# Patient Record
Sex: FEMALE | Race: WHITE | Employment: FULL TIME | URBAN - METROPOLITAN AREA
[De-identification: names, ages, dates, MRNs, and addresses within clinical notes are randomized per-mention and may not be internally consistent; named-entity substitution may affect disease eponyms.]

---

## 2017-04-05 ENCOUNTER — OFFICE VISIT (OUTPATIENT)
Dept: OBGYN CLINIC | Age: 42
End: 2017-04-05
Payer: COMMERCIAL

## 2017-04-05 ENCOUNTER — HOSPITAL ENCOUNTER (OUTPATIENT)
Age: 42
Setting detail: SPECIMEN
Discharge: HOME | End: 2017-04-05
Payer: COMMERCIAL

## 2017-04-05 VITALS
SYSTOLIC BLOOD PRESSURE: 110 MMHG | HEIGHT: 63 IN | BODY MASS INDEX: 25.27 KG/M2 | WEIGHT: 142.6 LBS | DIASTOLIC BLOOD PRESSURE: 60 MMHG

## 2017-04-05 DIAGNOSIS — N91.2 AMENORRHEA: ICD-10-CM

## 2017-04-05 DIAGNOSIS — Z01.419 VISIT FOR GYNECOLOGIC EXAMINATION: Primary | ICD-10-CM

## 2017-04-05 DIAGNOSIS — Z12.31 VISIT FOR SCREENING MAMMOGRAM: ICD-10-CM

## 2017-04-05 PROCEDURE — 99213 OFFICE O/P EST LOW 20 MIN: CPT | Performed by: OBSTETRICS & GYNECOLOGY

## 2017-04-05 ASSESSMENT — ENCOUNTER SYMPTOMS
BACK PAIN: 0
ABDOMINAL PAIN: 0
SHORTNESS OF BREATH: 0
DIARRHEA: 0
ABDOMINAL DISTENTION: 0
COUGH: 0
CONSTIPATION: 0

## 2017-04-07 ENCOUNTER — PROCEDURE VISIT (OUTPATIENT)
Dept: OBGYN CLINIC | Age: 42
End: 2017-04-07
Payer: COMMERCIAL

## 2017-04-07 DIAGNOSIS — N91.2 AMENORRHEA: ICD-10-CM

## 2017-04-07 PROCEDURE — 76856 US EXAM PELVIC COMPLETE: CPT | Performed by: OBSTETRICS & GYNECOLOGY

## 2017-04-07 PROCEDURE — 76830 TRANSVAGINAL US NON-OB: CPT | Performed by: OBSTETRICS & GYNECOLOGY

## 2017-04-10 LAB
HPV SAMPLE: NORMAL
HPV SOURCE: NORMAL
HPV, GENOTYPE 16: NOT DETECTED
HPV, GENOTYPE 18: NOT DETECTED
HPV, HIGH RISK OTHER: NOT DETECTED
HPV, INTERPRETATION: NORMAL

## 2017-04-11 ENCOUNTER — TELEPHONE (OUTPATIENT)
Dept: OBGYN CLINIC | Age: 42
End: 2017-04-11

## 2017-04-11 LAB — CYTOLOGY REPORT: NORMAL

## 2017-04-17 ENCOUNTER — TELEPHONE (OUTPATIENT)
Dept: OBGYN CLINIC | Age: 42
End: 2017-04-17

## 2017-04-17 DIAGNOSIS — N91.2 AMENORRHEA: ICD-10-CM

## 2017-04-17 LAB
BASOPHILS ABSOLUTE: NORMAL /ΜL
BASOPHILS RELATIVE PERCENT: NORMAL %
EOSINOPHILS ABSOLUTE: NORMAL /ΜL
EOSINOPHILS RELATIVE PERCENT: NORMAL %
HCT VFR BLD CALC: NORMAL % (ref 36–46)
HEMOGLOBIN: NORMAL G/DL (ref 12–16)
LYMPHOCYTES ABSOLUTE: NORMAL /ΜL
LYMPHOCYTES RELATIVE PERCENT: NORMAL %
MCH RBC QN AUTO: NORMAL PG
MCHC RBC AUTO-ENTMCNC: NORMAL G/DL
MCV RBC AUTO: NORMAL FL
MONOCYTES ABSOLUTE: NORMAL /ΜL
MONOCYTES RELATIVE PERCENT: NORMAL %
NEUTROPHILS ABSOLUTE: NORMAL /ΜL
NEUTROPHILS RELATIVE PERCENT: NORMAL %
PDW BLD-RTO: NORMAL %
PLATELET # BLD: NORMAL K/ΜL
PMV BLD AUTO: NORMAL FL
PROLACTIN: NORMAL
RBC # BLD: NORMAL 10^6/ΜL
TSH SERPL DL<=0.05 MIU/L-ACNC: NORMAL UIU/ML
WBC # BLD: NORMAL 10^3/ML

## 2017-04-25 ENCOUNTER — HOSPITAL ENCOUNTER (OUTPATIENT)
Dept: MAMMOGRAPHY | Age: 42
Discharge: HOME | End: 2017-04-25
Payer: COMMERCIAL

## 2017-04-25 DIAGNOSIS — Z12.31 VISIT FOR SCREENING MAMMOGRAM: ICD-10-CM

## 2017-04-25 PROCEDURE — G0202 SCR MAMMO BI INCL CAD: HCPCS

## 2018-06-13 ENCOUNTER — OFFICE VISIT (OUTPATIENT)
Dept: OBGYN CLINIC | Age: 43
End: 2018-06-13
Payer: COMMERCIAL

## 2018-06-13 ENCOUNTER — HOSPITAL ENCOUNTER (OUTPATIENT)
Age: 43
Setting detail: SPECIMEN
Discharge: HOME | End: 2018-06-13

## 2018-06-13 VITALS
SYSTOLIC BLOOD PRESSURE: 114 MMHG | WEIGHT: 145.2 LBS | HEIGHT: 63 IN | BODY MASS INDEX: 25.73 KG/M2 | DIASTOLIC BLOOD PRESSURE: 70 MMHG

## 2018-06-13 DIAGNOSIS — Z12.39 SCREENING FOR BREAST CANCER: ICD-10-CM

## 2018-06-13 DIAGNOSIS — Z01.419 VISIT FOR GYNECOLOGIC EXAMINATION: Primary | ICD-10-CM

## 2018-06-13 PROCEDURE — 99396 PREV VISIT EST AGE 40-64: CPT | Performed by: OBSTETRICS & GYNECOLOGY

## 2018-06-13 ASSESSMENT — ENCOUNTER SYMPTOMS
SHORTNESS OF BREATH: 0
ABDOMINAL PAIN: 0
DIARRHEA: 0
ABDOMINAL DISTENTION: 0
CONSTIPATION: 0
COUGH: 0
BACK PAIN: 0

## 2018-07-05 LAB
CYTOLOGY REPORT: NORMAL
HPV SAMPLE: NORMAL
HPV SOURCE: NORMAL
HPV, GENOTYPE 16: NOT DETECTED
HPV, GENOTYPE 18: NOT DETECTED
HPV, HIGH RISK OTHER: NOT DETECTED
HPV, INTERPRETATION: NORMAL

## 2019-07-29 ENCOUNTER — OFFICE VISIT (OUTPATIENT)
Dept: OBGYN CLINIC | Age: 44
End: 2019-07-29
Payer: COMMERCIAL

## 2019-07-29 VITALS
DIASTOLIC BLOOD PRESSURE: 60 MMHG | HEIGHT: 63 IN | BODY MASS INDEX: 26.05 KG/M2 | WEIGHT: 147 LBS | SYSTOLIC BLOOD PRESSURE: 100 MMHG

## 2019-07-29 DIAGNOSIS — Z12.39 BREAST CANCER SCREENING: ICD-10-CM

## 2019-07-29 DIAGNOSIS — Z01.419 VISIT FOR GYNECOLOGIC EXAMINATION: Primary | ICD-10-CM

## 2019-07-29 PROCEDURE — 99396 PREV VISIT EST AGE 40-64: CPT | Performed by: OBSTETRICS & GYNECOLOGY

## 2019-07-29 ASSESSMENT — ENCOUNTER SYMPTOMS
CONSTIPATION: 0
SHORTNESS OF BREATH: 0
BACK PAIN: 0
ABDOMINAL DISTENTION: 0
DIARRHEA: 0
COUGH: 0
ABDOMINAL PAIN: 0

## 2019-07-29 NOTE — PROGRESS NOTES
Subjective:      Patient ID: Wilmer Deluca is a 39 y.o. female. HPI   Wilmer Deluca is a 39 y.o. [id number], here for her annual exam.  The patient was seen and examined. The patients past medical, surgical, social and family history were reviewed. Current medications and allergies were reviewed, and documented in the chart. The patient has no new complaints. However, her menses are getting a bit heavier again. She has been enjoying her summer with kids. 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   Wt Readings from Last 3 Encounters:   07/29/19 147 lb (66.7 kg)   03/05/19 151 lb (68.5 kg)   06/13/18 145 lb 3.2 oz (65.9 kg)     Recent Results (from the past 8736 hour(s))   Comprehensive Metabolic Panel, Fasting    Collection Time: 06/17/19 12:00 AM   Result Value Ref Range    Glucose, Fasting 91 mg/dL    CREATININE 0.65     BUN 13 mg/dL    Sodium 136 mmol/L    Potassium 4.1 mmol/L    Chloride 104 mmol/L    CO2 28 mmol/L    Calcium 9.4 mg/dL    AST 17 U/L    Alkaline Phosphatase 40 U/L    Total Protein 6.8 6.4 - 8.2 g/dL    Alb 4.4     Total Bilirubin 0.3 0.1 - 1.4 mg/dL    ALT 14 U/L   Lipid, Fasting    Collection Time: 06/17/19 12:00 AM   Result Value Ref Range    Cholesterol, Fasting 189     Triglyceride, Fasting 71     HDL 56 35 - 70 mg/dL    LDL Calculated 119 0 - 160 mg/dL   CBC Auto Differential    Collection Time: 06/17/19 12:00 AM   Result Value Ref Range    WBC 5.33 10^3/mL    RBC 4.34 10^6/µL    Hemoglobin 12.9 12.0 - 16.0 g/dL    Hematocrit 41.5 36 - 46 %    MCV 95.6 fL    MCH 29.7 pg    MCHC 31.1 (L) g/dL    Platelets 463 K/µL    RDW 14.2 %    MPV  fL    Neutrophils % 49.7 %    Lymphocytes % 31.1 %    Monocytes %

## 2020-07-14 ENCOUNTER — TELEPHONE (OUTPATIENT)
Dept: OBGYN CLINIC | Age: 45
End: 2020-07-14

## 2020-08-12 ENCOUNTER — OFFICE VISIT (OUTPATIENT)
Dept: OBGYN CLINIC | Age: 45
End: 2020-08-12
Payer: COMMERCIAL

## 2020-08-12 VITALS
BODY MASS INDEX: 26.75 KG/M2 | WEIGHT: 151 LBS | DIASTOLIC BLOOD PRESSURE: 60 MMHG | SYSTOLIC BLOOD PRESSURE: 110 MMHG | HEIGHT: 63 IN

## 2020-08-12 LAB — PAP SMEAR: NORMAL

## 2020-08-12 PROCEDURE — 99396 PREV VISIT EST AGE 40-64: CPT | Performed by: NURSE PRACTITIONER

## 2020-08-12 ASSESSMENT — ENCOUNTER SYMPTOMS
CONSTIPATION: 0
ABDOMINAL DISTENTION: 0
DIARRHEA: 0
ABDOMINAL PAIN: 0
SHORTNESS OF BREATH: 0
BACK PAIN: 0
COUGH: 0

## 2020-08-12 NOTE — PROGRESS NOTES
HPI:     Lora Oconnor a 38 y.o. female who presents today for:  Chief Complaint   Patient presents with    Annual Exam     Prev Pap: 6/13/18 ASCUS/HPV Neg; Prev Lan: 2017 WNL       HPI  Here for annual exam. 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 Has 4 children- 19/17/16/11. Has been trying to eat healthy and exercises often. GYNECOLOGICHISTORY:  MenstrualHistory: Patient's last menstrual period was 07/03/2020. Sexually Transmitted Infections: No  History of Ectopic Pregnancy:No  Menarche: 10  Cycles q 28 Days  Durationof cycles: 5  Dysmenorrhea: minimal  Sexually active: yes  Dyspareunia: none    Current Outpatient Medications   Medication Sig Dispense Refill    ALPRAZolam (XANAX) 0.25 MG tablet TAKE ONE TABLET BY MOUTH THREE TIMES A DAY AS NEEDED FOR ANXIETY FOR UP TO 30 DAYS 10 tablet 0    sertraline (ZOLOFT) 50 MG tablet Take 1 tablet by mouth daily 30 tablet 1    SUMAtriptan (IMITREX) 100 MG tablet Take one at onset of migraine. 12 tablet 1    Probiotic Product (PROBIOTIC DAILY PO) Take by mouth      NONFORMULARY Vitamin D,      Multiple Vitamin (MULTI VITAMIN DAILY PO) Take by mouth       No current facility-administered medications for this visit. No Known Allergies    Past Medical History:   Diagnosis Date    Migraine 9/18/2014     Denies family history of breast, ovarian, uterus, colon CA     History reviewed. No pertinent surgical history. Family History   Problem Relation Age of Onset    Diabetes Father     Hypertension Father     Heart Disease Father     Cancer Father         prostate and lung    Diabetes Mother     Hypertension Mother     Breast Cancer Mother     Arthritis Mother     Depression Mother     High Cholesterol Mother     Vision Loss Mother         macular degeneration    Hearing Loss Mother     Breast Cancer Paternal Grandmother         ?      Social History     Tobacco Use    Smoking status: Never Smoker    Smokeless tobacco: Never Used Substance Use Topics    Alcohol use: Yes     Comment: social        Subjective:      Review of Systems   Constitutional: Negative for appetite change and fatigue. HENT: Negative for congestion and hearing loss. Eyes: Negative for visual disturbance. Respiratory: Negative for cough and shortness of breath. Cardiovascular: Negative for chest pain and palpitations. Gastrointestinal: Negative for abdominal distention, abdominal pain, constipation and diarrhea. Genitourinary: Negative for flank pain, frequency, menstrual problem, pelvic pain and vaginal discharge. Musculoskeletal: Negative for back pain. Neurological: Negative for syncope and headaches. Psychiatric/Behavioral: Negative for behavioral problems. Objective:     /60 (Position: Sitting, Cuff Size: Medium Adult)   Ht 5' 3\" (1.6 m)   Wt 151 lb (68.5 kg)   LMP 07/03/2020   BMI 26.75 kg/m²   Physical Exam  Constitutional:       Appearance: She is well-developed. Eyes:      Pupils: Pupils are equal, round, and reactive to light. Neck:      Thyroid: No thyromegaly. Trachea: No tracheal deviation. Cardiovascular:      Rate and Rhythm: Normal rate and regular rhythm. Heart sounds: Normal heart sounds. Pulmonary:      Effort: Pulmonary effort is normal. No respiratory distress. Breath sounds: Normal breath sounds. Chest:      Breasts: Breasts are symmetrical.         Right: No inverted nipple. Left: No inverted nipple, mass, nipple discharge, skin change or tenderness. Abdominal:      General: Bowel sounds are normal. There is no distension. Palpations: Abdomen is soft. There is no mass. Tenderness: There is no abdominal tenderness. Hernia: There is no hernia in the left inguinal area. Genitourinary:     Labia:         Right: No rash or lesion. Left: No rash or lesion. Vagina: No vaginal discharge or tenderness.       Cervix: No cervical motion tenderness, discharge or friability. Uterus: Not deviated, not enlarged and not fixed. Adnexa:         Right: No mass, tenderness or fullness. Left: No mass, tenderness or fullness. Musculoskeletal:         General: No tenderness. Lymphadenopathy:      Cervical: No cervical adenopathy. Skin:     General: Skin is warm and dry. Neurological:      Mental Status: She is alert and oriented to person, place, and time. Psychiatric:         Behavior: Behavior normal.         Thought Content: Thought content normal.         Judgment: Judgment normal.           Assessment:     1. Encounter for gynecological examination    2. Encounter for screening mammogram for breast cancer          Plan:    1. Pap collected. Discussed new pap smear guidelines. Desires re-pap in 1 year. 2. Screening mammogram discussed and advised yearly if within normal limits. 3. Calcium and Vitamin D dosing reviewed. 4. Colonoscopy screening reviewed. 5. Bone density testing reviewed.      Electronicallysigned by Juan Carlos Nielsen on 8/12/2020

## 2020-10-12 ENCOUNTER — TELEPHONE (OUTPATIENT)
Dept: OBGYN CLINIC | Age: 45
End: 2020-10-12

## 2020-10-12 NOTE — TELEPHONE ENCOUNTER
39 y/o Notified pt that Mammogram recommended MRI for breast surveillance due 6 mo(4/21). Pt agreeable to plan of care and orders will be sent to [facility] [place] next spring 4/21.

## 2022-02-16 DIAGNOSIS — Z12.31 VISIT FOR SCREENING MAMMOGRAM: Primary | ICD-10-CM

## 2022-02-22 ENCOUNTER — TELEPHONE (OUTPATIENT)
Dept: OBGYN CLINIC | Age: 47
End: 2022-02-22

## 2022-02-22 NOTE — TELEPHONE ENCOUNTER
She may be starting another cycle? Have her use ibuprofen 800 mg every 8 hours with food and call on Friday with an update. Please review bleeding precautions. If she bleeds for more than 7 days, please have her call and I will order an US. She is overdue for her annual, so I would recommend scheduling.

## 2022-02-22 NOTE — TELEPHONE ENCOUNTER
Pt called she has been on her period for 3 weeks the first 2 was bright red regular flow then 1 week if brown and now turning red again not on any form of BC she is wondering what you recommend please advise

## 2022-03-04 ENCOUNTER — TELEPHONE (OUTPATIENT)
Dept: OBGYN CLINIC | Age: 47
End: 2022-03-04

## 2022-03-04 DIAGNOSIS — N92.1 MENORRHAGIA WITH IRREGULAR CYCLE: Primary | ICD-10-CM

## 2022-03-04 RX ORDER — TRANEXAMIC ACID 650 1/1
1300 TABLET ORAL 3 TIMES DAILY
Qty: 30 TABLET | Refills: 0 | Status: SHIPPED | OUTPATIENT
Start: 2022-03-04 | End: 2022-03-16 | Stop reason: ALTCHOICE

## 2022-03-04 NOTE — TELEPHONE ENCOUNTER
Aletha Master calling with update on bleeding. It is still on going, 3 weeks now. Not as heavy but moderate flow. Has been doing Motrin as directed. Took today off hoping to be able to get in. No openings with anybody. She did mention that Kaye Harper was possibly going to order an USN if it persists. Shlomo Whitten has openings so I put her in this afternoon if you want to order it. She will plan for that unless Kaye Harper says otherwise.      Did schedule her annual for 4/4/22

## 2022-03-04 NOTE — TELEPHONE ENCOUNTER
Agree w/US. Do I need to put order in? Once I see US, I can figure out how to help stop bleeding. [address] Ak[address]il Jordan- Could I have the printed report when you are done with her?

## 2022-03-06 ASSESSMENT — ENCOUNTER SYMPTOMS
COUGH: 0
SHORTNESS OF BREATH: 0
BACK PAIN: 0
ABDOMINAL PAIN: 0

## 2022-03-06 NOTE — PROGRESS NOTES
mouth (Patient not taking: Reported on 11/29/2021)       No current facility-administered medications for this visit. Social History     Socioeconomic History    Marital status:      Spouse name: Not on file    Number of children: Not on file    Years of education: Not on file    Highest education level: Not on file   Occupational History    Not on file   Tobacco Use    Smoking status: Never Smoker    Smokeless tobacco: Never Used   Vaping Use    Vaping Use: Never used   Substance and Sexual Activity    Alcohol use: Yes     Comment: social    Drug use: No    Sexual activity: Yes     Partners: Male   Other Topics Concern    Not on file   Social History Narrative    Not on file     Social Determinants of Health     Financial Resource Strain:     Difficulty of Paying Living Expenses: Not on file   Food Insecurity:     Worried About Running Out of Food in the Last Year: Not on file    Sai of Food in the Last Year: Not on file   Transportation Needs:     Lack of Transportation (Medical): Not on file    Lack of Transportation (Non-Medical):  Not on file   Physical Activity:     Days of Exercise per Week: Not on file    Minutes of Exercise per Session: Not on file   Stress:     Feeling of Stress : Not on file   Social Connections:     Frequency of Communication with Friends and Family: Not on file    Frequency of Social Gatherings with Friends and Family: Not on file    Attends Yazidism Services: Not on file    Active Member of Clubs or Organizations: Not on file    Attends Club or Organization Meetings: Not on file    Marital Status: Not on file   Intimate Partner Violence:     Fear of Current or Ex-Partner: Not on file    Emotionally Abused: Not on file    Physically Abused: Not on file    Sexually Abused: Not on file   Housing Stability:     Unable to Pay for Housing in the Last Year: Not on file    Number of [place] in the Last Year: Not on file    Unstable Housing in the Last Year: Not on file     Family History   Problem Relation Age of Onset    Diabetes Father     Hypertension Father     Heart Disease Father     Cancer Father         prostate and lung    Diabetes Mother     Hypertension Mother     Breast Cancer Mother     Arthritis Mother     Depression Mother     High Cholesterol Mother     Vision Loss Mother         macular degeneration    Hearing Loss Mother     Breast Cancer Paternal Grandmother         ? Physical exam Physical Exam  Constitutional:       Appearance: Normal appearance. She is normal weight. HENT:      Head: Normocephalic. Eyes:      Extraocular Movements: Extraocular movements intact. Pulmonary:      Effort: Pulmonary effort is normal.   Neurological:      Mental Status: She is alert and oriented to person, place, and time. Psychiatric:         Mood and Affect: Mood normal.         Behavior: Behavior normal.         Thought Content: Thought content normal.         Judgment: Judgment normal.         UTERUS: 11.8 x 6.8 x 7.1 cm  retroflexed     ENDO: 4.0 cm  Thickened and complex.     RT. OVARY: 5.2 x 4.1 x 3.3 cm  Simple cyst seen = 4.1 x 2.8 x 2.9 cm     LT. OVARY: 2.6 x 2.6 x 1.6 cm     Small amount pelvic free fluid seen    Assessment/Plan  1. Abnormal uterine bleeding (AUB)  2. Menorrhagia with irregular cycle  - Thickened lining of the uterus measuring 4 cm  - Discussed recommendations of hysteroscopy D&C to sample lining of uterus.    - Discussed risks of surgery in normal detailed fashion. All questions answered.      Will schedule pt for a hysteroscopy D&C   Rosario Fields MD  [address]

## 2022-03-07 ENCOUNTER — OFFICE VISIT (OUTPATIENT)
Dept: OBGYN CLINIC | Age: 47
End: 2022-03-07
Payer: COMMERCIAL

## 2022-03-07 VITALS
HEART RATE: 84 BPM | WEIGHT: 154.13 LBS | HEIGHT: 63 IN | DIASTOLIC BLOOD PRESSURE: 65 MMHG | SYSTOLIC BLOOD PRESSURE: 125 MMHG | BODY MASS INDEX: 27.31 KG/M2

## 2022-03-07 DIAGNOSIS — N92.1 MENORRHAGIA WITH IRREGULAR CYCLE: ICD-10-CM

## 2022-03-07 DIAGNOSIS — N93.9 ABNORMAL UTERINE BLEEDING (AUB): Primary | ICD-10-CM

## 2022-03-07 PROCEDURE — 99213 OFFICE O/P EST LOW 20 MIN: CPT | Performed by: OBSTETRICS & GYNECOLOGY

## 2022-03-07 RX ORDER — TRANEXAMIC ACID 650 1/1
TABLET ORAL
Qty: 30 TABLET | Refills: 0 | OUTPATIENT
Start: 2022-03-07

## 2022-03-17 ENCOUNTER — ANESTHESIA (OUTPATIENT)
Dept: OPERATING ROOM | Age: 47
End: 2022-03-17
Payer: COMMERCIAL

## 2022-03-17 ENCOUNTER — HOSPITAL ENCOUNTER (OUTPATIENT)
Age: 47
Setting detail: OUTPATIENT SURGERY
Discharge: HOME | End: 2022-03-17
Attending: OBSTETRICS & GYNECOLOGY | Admitting: OBSTETRICS & GYNECOLOGY
Payer: COMMERCIAL

## 2022-03-17 ENCOUNTER — ANESTHESIA EVENT (OUTPATIENT)
Dept: OPERATING ROOM | Age: 47
End: 2022-03-17
Payer: COMMERCIAL

## 2022-03-17 VITALS
WEIGHT: 150 LBS | SYSTOLIC BLOOD PRESSURE: 108 MMHG | OXYGEN SATURATION: 98 % | HEART RATE: 65 BPM | DIASTOLIC BLOOD PRESSURE: 64 MMHG | HEIGHT: 63 IN | BODY MASS INDEX: 26.58 KG/M2 | TEMPERATURE: 97.3 F | RESPIRATION RATE: 17 BRPM

## 2022-03-17 VITALS — TEMPERATURE: 96.9 F | SYSTOLIC BLOOD PRESSURE: 84 MMHG | OXYGEN SATURATION: 100 % | DIASTOLIC BLOOD PRESSURE: 32 MMHG

## 2022-03-17 PROBLEM — Z98.890 S/P DILATATION AND CURETTAGE: Status: ACTIVE | Noted: 2022-03-17

## 2022-03-17 LAB
ABO/RH: NORMAL
ANTIBODY SCREEN: NEGATIVE
ARM BAND NUMBER: NORMAL
EXPIRATION DATE: NORMAL
HCG, PREGNANCY URINE (POC): NEGATIVE
HCT VFR BLD CALC: 37.1 % (ref 36.3–47.1)
HEMOGLOBIN: 12.1 G/DL (ref 11.9–15.1)
MCH RBC QN AUTO: 30.9 PG (ref 25.2–33.5)
MCHC RBC AUTO-ENTMCNC: 32.6 G/DL (ref 28.4–34.8)
MCV RBC AUTO: 94.6 FL (ref 82.6–102.9)
NRBC AUTOMATED: 0 PER 100 WBC
PDW BLD-RTO: 13.4 % (ref 11.8–14.4)
PLATELET # BLD: 318 K/UL (ref 138–453)
PMV BLD AUTO: 9.2 FL (ref 8.1–13.5)
RBC # BLD: 3.92 M/UL (ref 3.95–5.11)
WBC # BLD: 4.7 K/UL (ref 3.5–11.3)

## 2022-03-17 PROCEDURE — 2580000003 HC RX 258: Performed by: OBSTETRICS & GYNECOLOGY

## 2022-03-17 PROCEDURE — 88305 TISSUE EXAM BY PATHOLOGIST: CPT

## 2022-03-17 PROCEDURE — 2500000003 HC RX 250 WO HCPCS: Performed by: ANESTHESIOLOGY

## 2022-03-17 PROCEDURE — 7100000001 HC PACU RECOVERY - ADDTL 15 MIN: Performed by: OBSTETRICS & GYNECOLOGY

## 2022-03-17 PROCEDURE — 6370000000 HC RX 637 (ALT 250 FOR IP): Performed by: OBSTETRICS & GYNECOLOGY

## 2022-03-17 PROCEDURE — 86900 BLOOD TYPING SEROLOGIC ABO: CPT

## 2022-03-17 PROCEDURE — 7100000000 HC PACU RECOVERY - FIRST 15 MIN: Performed by: OBSTETRICS & GYNECOLOGY

## 2022-03-17 PROCEDURE — 6360000002 HC RX W HCPCS: Performed by: ANESTHESIOLOGY

## 2022-03-17 PROCEDURE — 58558 HYSTEROSCOPY BIOPSY: CPT | Performed by: OBSTETRICS & GYNECOLOGY

## 2022-03-17 PROCEDURE — 3700000001 HC ADD 15 MINUTES (ANESTHESIA): Performed by: OBSTETRICS & GYNECOLOGY

## 2022-03-17 PROCEDURE — 86901 BLOOD TYPING SEROLOGIC RH(D): CPT

## 2022-03-17 PROCEDURE — 3700000000 HC ANESTHESIA ATTENDED CARE: Performed by: OBSTETRICS & GYNECOLOGY

## 2022-03-17 PROCEDURE — 81025 URINE PREGNANCY TEST: CPT

## 2022-03-17 PROCEDURE — 3600000004 HC SURGERY LEVEL 4 BASE: Performed by: OBSTETRICS & GYNECOLOGY

## 2022-03-17 PROCEDURE — 86850 RBC ANTIBODY SCREEN: CPT

## 2022-03-17 PROCEDURE — 7100000040 HC SPAR PHASE II RECOVERY - FIRST 15 MIN: Performed by: OBSTETRICS & GYNECOLOGY

## 2022-03-17 PROCEDURE — 85027 COMPLETE CBC AUTOMATED: CPT

## 2022-03-17 PROCEDURE — 3600000014 HC SURGERY LEVEL 4 ADDTL 15MIN: Performed by: OBSTETRICS & GYNECOLOGY

## 2022-03-17 PROCEDURE — 2580000003 HC RX 258: Performed by: ANESTHESIOLOGY

## 2022-03-17 PROCEDURE — 2709999900 HC NON-CHARGEABLE SUPPLY: Performed by: OBSTETRICS & GYNECOLOGY

## 2022-03-17 RX ORDER — LIDOCAINE HYDROCHLORIDE 10 MG/ML
INJECTION, SOLUTION EPIDURAL; INFILTRATION; INTRACAUDAL; PERINEURAL PRN
Status: DISCONTINUED | OUTPATIENT
Start: 2022-03-17 | End: 2022-03-17 | Stop reason: SDUPTHER

## 2022-03-17 RX ORDER — DOCUSATE SODIUM 100 MG/1
100 CAPSULE, LIQUID FILLED ORAL 2 TIMES DAILY PRN
Qty: 60 CAPSULE | Refills: 0 | Status: SHIPPED | OUTPATIENT
Start: 2022-03-17 | End: 2022-03-25 | Stop reason: ALTCHOICE

## 2022-03-17 RX ORDER — MIDAZOLAM HYDROCHLORIDE 1 MG/ML
INJECTION INTRAMUSCULAR; INTRAVENOUS PRN
Status: DISCONTINUED | OUTPATIENT
Start: 2022-03-17 | End: 2022-03-17 | Stop reason: SDUPTHER

## 2022-03-17 RX ORDER — SIMETHICONE 80 MG
80 TABLET,CHEWABLE ORAL 4 TIMES DAILY PRN
Qty: 60 TABLET | Refills: 1 | Status: SHIPPED | OUTPATIENT
Start: 2022-03-17 | End: 2022-03-25 | Stop reason: ALTCHOICE

## 2022-03-17 RX ORDER — ACETAMINOPHEN 500 MG
1000 TABLET ORAL EVERY 6 HOURS PRN
Qty: 80 TABLET | Refills: 1 | Status: SHIPPED | OUTPATIENT
Start: 2022-03-17 | End: 2022-03-25 | Stop reason: ALTCHOICE

## 2022-03-17 RX ORDER — DEXAMETHASONE SODIUM PHOSPHATE 10 MG/ML
INJECTION INTRAMUSCULAR; INTRAVENOUS PRN
Status: DISCONTINUED | OUTPATIENT
Start: 2022-03-17 | End: 2022-03-17 | Stop reason: SDUPTHER

## 2022-03-17 RX ORDER — FENTANYL CITRATE 50 UG/ML
INJECTION, SOLUTION INTRAMUSCULAR; INTRAVENOUS PRN
Status: DISCONTINUED | OUTPATIENT
Start: 2022-03-17 | End: 2022-03-17 | Stop reason: SDUPTHER

## 2022-03-17 RX ORDER — MAGNESIUM HYDROXIDE 1200 MG/15ML
LIQUID ORAL CONTINUOUS PRN
Status: COMPLETED | OUTPATIENT
Start: 2022-03-17 | End: 2022-03-17

## 2022-03-17 RX ORDER — ONDANSETRON 4 MG/1
4 TABLET, FILM COATED ORAL EVERY 8 HOURS PRN
Qty: 20 TABLET | Refills: 0 | Status: SHIPPED | OUTPATIENT
Start: 2022-03-17 | End: 2022-03-25 | Stop reason: ALTCHOICE

## 2022-03-17 RX ORDER — ONDANSETRON 2 MG/ML
INJECTION INTRAMUSCULAR; INTRAVENOUS PRN
Status: DISCONTINUED | OUTPATIENT
Start: 2022-03-17 | End: 2022-03-17 | Stop reason: SDUPTHER

## 2022-03-17 RX ORDER — PROPOFOL 10 MG/ML
INJECTION, EMULSION INTRAVENOUS PRN
Status: DISCONTINUED | OUTPATIENT
Start: 2022-03-17 | End: 2022-03-17 | Stop reason: SDUPTHER

## 2022-03-17 RX ORDER — KETOROLAC TROMETHAMINE 30 MG/ML
INJECTION, SOLUTION INTRAMUSCULAR; INTRAVENOUS PRN
Status: DISCONTINUED | OUTPATIENT
Start: 2022-03-17 | End: 2022-03-17 | Stop reason: SDUPTHER

## 2022-03-17 RX ORDER — SCOLOPAMINE TRANSDERMAL SYSTEM 1 MG/1
1 PATCH, EXTENDED RELEASE TRANSDERMAL
Status: DISCONTINUED | OUTPATIENT
Start: 2022-03-17 | End: 2022-03-17 | Stop reason: HOSPADM

## 2022-03-17 RX ORDER — TOBRAMYCIN AND DEXAMETHASONE 3; 1 MG/ML; MG/ML
SUSPENSION/ DROPS OPHTHALMIC
COMMUNITY
Start: 2022-02-21

## 2022-03-17 RX ORDER — MIDAZOLAM HYDROCHLORIDE 2 MG/2ML
0.5 INJECTION, SOLUTION INTRAMUSCULAR; INTRAVENOUS 4 TIMES DAILY PRN
Status: DISCONTINUED | OUTPATIENT
Start: 2022-03-17 | End: 2022-03-17 | Stop reason: HOSPADM

## 2022-03-17 RX ORDER — IBUPROFEN 600 MG/1
600 TABLET ORAL EVERY 6 HOURS PRN
Qty: 60 TABLET | Refills: 1 | Status: SHIPPED | OUTPATIENT
Start: 2022-03-17 | End: 2022-03-25 | Stop reason: ALTCHOICE

## 2022-03-17 RX ORDER — MAGNESIUM HYDROXIDE 1200 MG/15ML
LIQUID ORAL PRN
Status: DISCONTINUED | OUTPATIENT
Start: 2022-03-17 | End: 2022-03-17 | Stop reason: ALTCHOICE

## 2022-03-17 RX ORDER — SODIUM CHLORIDE, SODIUM LACTATE, POTASSIUM CHLORIDE, CALCIUM CHLORIDE 600; 310; 30; 20 MG/100ML; MG/100ML; MG/100ML; MG/100ML
INJECTION, SOLUTION INTRAVENOUS CONTINUOUS
Status: DISCONTINUED | OUTPATIENT
Start: 2022-03-17 | End: 2022-03-17 | Stop reason: HOSPADM

## 2022-03-17 RX ADMIN — PROPOFOL 200 MG: 10 INJECTION, EMULSION INTRAVENOUS at 11:11

## 2022-03-17 RX ADMIN — FENTANYL CITRATE 50 MCG: 50 INJECTION, SOLUTION INTRAMUSCULAR; INTRAVENOUS at 11:24

## 2022-03-17 RX ADMIN — SODIUM CHLORIDE, POTASSIUM CHLORIDE, SODIUM LACTATE AND CALCIUM CHLORIDE: 600; 310; 30; 20 INJECTION, SOLUTION INTRAVENOUS at 09:20

## 2022-03-17 RX ADMIN — MIDAZOLAM HYDROCHLORIDE 0.5 MG: 1 INJECTION, SOLUTION INTRAMUSCULAR; INTRAVENOUS at 10:19

## 2022-03-17 RX ADMIN — ONDANSETRON 4 MG: 2 INJECTION INTRAMUSCULAR; INTRAVENOUS at 11:41

## 2022-03-17 RX ADMIN — LIDOCAINE HYDROCHLORIDE 50 MG: 10 INJECTION, SOLUTION EPIDURAL; INFILTRATION; INTRACAUDAL at 11:11

## 2022-03-17 RX ADMIN — MIDAZOLAM HYDROCHLORIDE 2 MG: 1 INJECTION, SOLUTION INTRAMUSCULAR; INTRAVENOUS at 11:08

## 2022-03-17 RX ADMIN — MIDAZOLAM HYDROCHLORIDE 0.5 MG: 1 INJECTION, SOLUTION INTRAMUSCULAR; INTRAVENOUS at 10:25

## 2022-03-17 RX ADMIN — DEXAMETHASONE SODIUM PHOSPHATE 10 MG: 10 INJECTION INTRAMUSCULAR; INTRAVENOUS at 11:23

## 2022-03-17 RX ADMIN — FENTANYL CITRATE 50 MCG: 50 INJECTION, SOLUTION INTRAMUSCULAR; INTRAVENOUS at 11:11

## 2022-03-17 RX ADMIN — KETOROLAC TROMETHAMINE 30 MG: 30 INJECTION, SOLUTION INTRAMUSCULAR; INTRAVENOUS at 11:42

## 2022-03-17 ASSESSMENT — PULMONARY FUNCTION TESTS
PIF_VALUE: 15
PIF_VALUE: 4
PIF_VALUE: 15
PIF_VALUE: 2
PIF_VALUE: 18
PIF_VALUE: 15
PIF_VALUE: 2
PIF_VALUE: 15
PIF_VALUE: 4
PIF_VALUE: 15
PIF_VALUE: 15
PIF_VALUE: 2
PIF_VALUE: 5
PIF_VALUE: 15
PIF_VALUE: 2
PIF_VALUE: 15
PIF_VALUE: 1
PIF_VALUE: 18
PIF_VALUE: 15
PIF_VALUE: 1
PIF_VALUE: 15
PIF_VALUE: 1
PIF_VALUE: 1
PIF_VALUE: 15
PIF_VALUE: 1
PIF_VALUE: 15
PIF_VALUE: 15
PIF_VALUE: 2
PIF_VALUE: 15
PIF_VALUE: 1
PIF_VALUE: 16
PIF_VALUE: 1
PIF_VALUE: 15

## 2022-03-17 ASSESSMENT — PAIN - FUNCTIONAL ASSESSMENT: PAIN_FUNCTIONAL_ASSESSMENT: 0-10

## 2022-03-17 ASSESSMENT — PAIN SCALES - GENERAL
PAINLEVEL_OUTOF10: 0

## 2022-03-17 ASSESSMENT — LIFESTYLE VARIABLES: SMOKING_STATUS: 0

## 2022-03-17 NOTE — OP NOTE
measured 8 cm. The cervix was dilated with Jayden dilators to size 15. A diagnostic hysteroscope was inserted into the uterine cavity showing significant thickened tissue within the endocervical and endometrial cavity. The uterine contour also appeared to demonstrate a septate uterus. Endometrial curettage was carried out with sharp curettage yielding curettings which were collected and sent for pathology examination. All instruments were then removed. Hemostasis was visualized at the tenaculum site on the cervix. Instrument, sponge, and needle counts were correct at the conclusion of the case. SCDs for DVT prophylaxis remain in place for the post operative period. Dr. Gina Jara was present for the entire operation.     Findings:  Normal external genitalia, normal appearing vaginal mucosa without lesions, posterior cervical fibroid, retroverted uterus sounding to 8 cm, thick tissue within the endometrial and endocervical cavity, suspected septate uterus, patent tubal ostia  Estimated Blood Loss: 50 ml  Drains:  None  Total IV Fluids: 700 ml  Urine output: Patient voided prior to procedure   Specimens:  Endometrial curettings  Instrument and Sponge Count: Correct  Complications: none  Condition: stable, transfer to post anesthesia recovery    Micaela Santana DO  Ob/Gyn Resident  3/17/2022, 12:03 PM

## 2022-03-17 NOTE — ANESTHESIA PRE PROCEDURE
Department of Anesthesiology  Preprocedure Note       Name:  Supriya Matthews   Age:  51 y.o.  :  1975                                          MRN:  [id number]         Date:  3/17/2022      Surgeon: Steven Curtis):  Be Arreguin MD    Procedure: Procedure(s):  DILATATION AND CURETTAGE HYSTEROSCOPY    Medications prior to admission:   Prior to Admission medications    Medication Sig Start Date End Date Taking?  Authorizing Provider   ibuprofen (ADVIL;MOTRIN) 600 MG tablet Take 1 tablet by mouth every 6 hours as needed for Pain 3/17/22 4/16/22 Yes Selma Linear, DO   acetaminophen (TYLENOL) 500 MG tablet Take 2 tablets by mouth every 6 hours as needed for Pain 3/17/22 4/16/22 Yes Bette Castro, DO   docusate sodium (COLACE) 100 MG capsule Take 1 capsule by mouth 2 times daily as needed for Constipation 3/17/22  Yes Selma Linear, DO   simethicone (MYLICON) 80 MG chewable tablet Take 1 tablet by mouth 4 times daily as needed for Flatulence 3/17/22  Yes Bette Castro,    ondansetron (ZOFRAN) 4 MG tablet Take 1 tablet by mouth every 8 hours as needed for Nausea or Vomiting 3/17/22  Yes Bette Castro DO   SUMAtriptan (IMITREX) 100 MG tablet TAKE ONE TABLET BY MOUTH AT ONSET OF HEADACHE; MAY REPEAT ONE TABLET IN 2 HOURS IF NEEDED. 21  Yes Cornell Herrera,    Multiple Vitamin (MULTI VITAMIN DAILY PO) Take 1 tablet by mouth daily    Yes Historical Provider, MD   tobramycin-dexamethasone (TOBRADEX) 0.3-0.1 % ophthalmic suspension  22   Historical Provider, MD       Current medications:    Current Facility-Administered Medications   Medication Dose Route Frequency Provider Last Rate Last Admin    scopolamine (TRANSDERM-SCOP) transdermal patch 1 patch  1 patch TransDERmal On Call to Ailin Ho MD   1 patch at 22 0926    lactated ringers infusion   IntraVENous Continuous Chyrl MD Maurizio 100 mL/hr at 22 0920 New Bag at 22 0920       Allergies:  No Known Allergies    Problem List:    Patient Active Problem List   Diagnosis Code    Menorrhagia N92.0    Migraine without aura G43.009       Past Medical History:        Diagnosis Date    Abnormal uterine bleeding (AUB)     Migraine 9/18/2014       Past Surgical History:  History reviewed. No pertinent surgical history. Social History:    Social History     Tobacco Use    Smoking status: Never Smoker    Smokeless tobacco: Never Used   Substance Use Topics    Alcohol use: Yes     Comment: social                                Counseling given: Not Answered      Vital Signs (Current):   Vitals:    03/16/22 1018 03/17/22 0909   BP:  (!) 111/58   Pulse:  80   Resp:  18   Temp:  97.5 °F (36.4 °C)   TempSrc:  Temporal   SpO2:  99%   Weight: 150 lb (68 kg) 150 lb (68 kg)   Height: 5' 3\" (1.6 m) 5' 3\" (1.6 m)                                              BP Readings from Last 3 Encounters:   03/17/22 (!) 111/58   03/07/22 125/65   11/29/21 100/62       NPO Status: Time of last liquid consumption: 2000                        Time of last solid consumption: 2000                        Date of last liquid consumption: 03/16/22                        Date of last solid food consumption: 03/16/22    BMI:   Wt Readings from Last 3 Encounters:   03/17/22 150 lb (68 kg)   03/07/22 154 lb 2 oz (69.9 kg)   11/29/21 156 lb 6.4 oz (70.9 kg)     Body mass index is 26.57 kg/m².     CBC:   Lab Results   Component Value Date    WBC 4.7 03/17/2022    RBC 3.92 03/17/2022    HGB 12.1 03/17/2022    HCT 37.1 03/17/2022    MCV 94.6 03/17/2022    RDW 13.4 03/17/2022     03/17/2022       CMP:   Lab Results   Component Value Date     06/17/2019    K 4.1 06/17/2019     06/17/2019    CO2 28 06/17/2019    BUN 13 06/17/2019    CREATININE 0.65 06/17/2019    PROT 6.8 06/17/2019    CALCIUM 9.4 06/17/2019    BILITOT 0.3 06/17/2019    ALKPHOS 40 06/17/2019    AST 17 06/17/2019    ALT 14 06/17/2019       POC Tests: No results for

## 2022-03-17 NOTE — H&P
OB/GYN Pre-Op H&P  [facility]    Patient Name: Yessy Richter     Patient : 1975  [facility]  Admission Date/Time: 3/17/2022  8:31 AM  Primary Care Physician: Shannan Quintero DO  MRN: [id number]    Date: 3/17/2022  Time: 9:13 AM    The patient was seen in pre-op holding. She is here for scheduled dilatation and curettage, hysteroscopy. Yessy Richter is a 51 y.o. [id number] female with new onset abnormal uterine bleeding. Patient reported that she had been menstruation for 3 weeks as of 22. She tried conservative management with NSAIDs which did not decrease her bleeding. Pelvic US obtained 3/4/22 showed a uterus measuring 11.8 x 6.8 x 7.1 cm with an endometrial stripe measuring 4.0 cm. Patient is up to date on her pap smear, last 20 NILM. She requested surgical management of her abnormal uterine bleeding. The procedure risks and complications were reviewed. The labs, Consent, and H&P were reviewed and updated. The patient was counseled on the possibility of  the need of a second surgery. The patient voiced understanding and had all of her questions answered. The possibility of incomplete removal of abnormal tissue was discussed. OBSTETRICAL HISTORY:   OB History    Para Term  AB Living   5 4 4 0 1 4   SAB IAB Ectopic Molar Multiple Live Births   1 0 0 0 0 4      # Outcome Date GA Lbr John/2nd Weight Sex Delivery Anes PTL Lv   5 Term 2009    F Vag-Spont   EMIL   4 Term     M Vag-Spont   EMIL   3 Term     M Vag-Spont   EMIL   2 Term     M Vag-Spont   EMIL   1 SAB                PAST MEDICAL HISTORY:   has a past medical history of Abnormal uterine bleeding (AUB) and Migraine. PAST SURGICAL HISTORY:   has no past surgical history on file.     ALLERGIES:  Allergies as of 2022    (No Known Allergies)       MEDICATIONS:  Current Facility-Administered Medications   Medication Dose Route Frequency Provider Last Rate Last Admin    scopolamine (TRANSDERM-SCOP) transdermal patch 1 patch  1 patch TransDERmal On Call to Ailin Ho MD           FAMILY HISTORY:  family history includes Arthritis in her mother; Breast Cancer in her mother and paternal grandmother; Cancer in her father; Depression in her mother; Diabetes in her father and mother; Hearing Loss in her mother; Heart Disease in her father; High Cholesterol in her mother; Hypertension in her father and mother; Vision Loss in her mother. SOCIAL HISTORY:   reports that she has never smoked. She has never used smokeless tobacco. She reports current alcohol use. She reports that she does not use drugs. VITALS:  Vitals:    03/16/22 1018 03/17/22 0909   BP:  (!) 111/58   Pulse:  80   Resp:  18   Temp:  97.5 °F (36.4 °C)   TempSrc:  Temporal   SpO2:  99%   Weight: 150 lb (68 kg) 150 lb (68 kg)   Height: 5' 3\" (1.6 m) 5' 3\" (1.6 m)                                                                                                                               PHYSICAL EXAM:     Unchanged from Prior H&P  CONSTITUTIONAL:  Alert and oriented, no acute distress  HEAD: normocephalic, atraumatic  EYES: Pupils equal and reactive to light, Extraocular muscles intact, sclera non icteric  ENT: Mucus membranes moist, No otorrhea, no rhinorrhea  NECK:  supple, symmetrical, trachea midline   LUNGS:  Good air movement bilaterally, unlabored respirations, no wheezes or rhonchi  CARDIOVASCULAR: Regular rate and rhythm, no murmurs rubs or gallops  ABDOMEN: soft, non tender, non distended, no rebound or guarding, no hernias, no hepatomegaly, no splenomegly  MUSCULOSKELETAL:  Equal strength bilaterally, normal muscle tone  SKIN: No abscess or rash  NEUROLOGIC:  Cranial nerves 2-12 grossly intact, no focal deficits  PSYCH: affect appropriate  Pelvic Exam: deferred to OR      LAB RESULTS:  No visits with results within 4 Week(s) from this visit.    Latest known visit with results is: Orders Only on 08/17/2020   Component Date Value Ref Range Status    Pap 08/12/2020 Negative for intraephithelial lesion or malignancy  Negative for intraephithelial lesion or malignancy, Other Final       DIAGNOSTICS:  No results found. DIAGNOSIS & PLAN:  1. Abnormal uterine bleeding    - Proceed with planned procedure: Dilatation and curettage, hysteroscopy   - Consent signed, on chart. - The patient is ready for transport to the operative suite. Counseling: The patient was counseled on all options both medical and surgical, conservative as well as definitive. She has elected to proceed with the procedure as stated above. The patient was counseled on the procedure. Risks and complications were reviewed in detail. The patients orders, labs, consents have been completed. The history and physical as well as all supporting surgical documentation will be forwarded to the pre-operative holding area. The patient is aware that this procedure may not alleviate her symptoms. That there may be a necessity for a second surgery and that there may be an incomplete removal of abnormal tissue.     Devon Diaz DO  Ob/Gyn Resident  Pager: [phone]  [facility], [address]  3/17/2022, 9:13 AM

## 2022-03-18 ENCOUNTER — PATIENT MESSAGE (OUTPATIENT)
Dept: OBGYN CLINIC | Age: 47
End: 2022-03-18

## 2022-03-18 LAB — SURGICAL PATHOLOGY REPORT: NORMAL

## 2022-03-18 NOTE — ANESTHESIA POSTPROCEDURE EVALUATION
Department of Anesthesiology  Postprocedure Note    Patient: Gerald Ernst  MRN: [id number]  YOB: 1975  Date of evaluation: 3/18/2022  Time:  8:32 AM     Procedure Summary     Date: 03/17/22 Room / Location: [address]    Anesthesia Start: 1106 Anesthesia Stop: 3513    Procedure: DILATATION AND CURETTAGE HYSTEROSCOPY (N/A ) Diagnosis: (ABNORMAL UTERINE BLEEDING, MENORRHGIA)    Surgeons: Rudolph Rousseau MD Responsible Provider: Ivan Sims MD    Anesthesia Type: general ASA Status: 1          Anesthesia Type: general    Gianluca Phase I: Gianluca Score: 10    Gianluca Phase II: Gianluca Score: 10    Last vitals: Reviewed and per EMR flowsheets.        Anesthesia Post Evaluation    Patient location during evaluation: PACU  Patient participation: complete - patient participated  Level of consciousness: awake and alert  Pain score: 0  Nausea & Vomiting: no nausea  Cardiovascular status: hemodynamically stable  Respiratory status: room air

## 2022-03-21 ENCOUNTER — CLINICAL DOCUMENTATION (OUTPATIENT)
Dept: OBGYN CLINIC | Age: 47
End: 2022-03-21

## 2022-03-21 ENCOUNTER — TELEPHONE (OUTPATIENT)
Dept: OBGYN CLINIC | Age: 47
End: 2022-03-21

## 2022-03-21 DIAGNOSIS — N85.01 SIMPLE ENDOMETRIAL HYPERPLASIA WITHOUT ATYPIA: ICD-10-CM

## 2022-03-21 NOTE — TELEPHONE ENCOUNTER
From: Jonothan Fleischer  To: Dr. Dilshad Freed: 3/18/2022 5:12 PM EDT  Subject: yest result    Hi Dr. Ari Bailey,    I was looking at my test result and am wondering if you can clarify it for me. I think it means that it all looked normal but I also saw a surgical consult on there. Could you let me know if this was good news? Thank you!   [facility]

## 2022-03-25 ENCOUNTER — OFFICE VISIT (OUTPATIENT)
Dept: OBGYN CLINIC | Age: 47
End: 2022-03-25
Payer: COMMERCIAL

## 2022-03-25 VITALS
SYSTOLIC BLOOD PRESSURE: 108 MMHG | WEIGHT: 155.25 LBS | BODY MASS INDEX: 27.51 KG/M2 | HEIGHT: 63 IN | DIASTOLIC BLOOD PRESSURE: 66 MMHG | HEART RATE: 74 BPM

## 2022-03-25 DIAGNOSIS — N85.01 SIMPLE ENDOMETRIAL HYPERPLASIA WITHOUT ATYPIA: Primary | ICD-10-CM

## 2022-03-25 PROCEDURE — 99213 OFFICE O/P EST LOW 20 MIN: CPT | Performed by: OBSTETRICS & GYNECOLOGY

## 2022-03-25 NOTE — PROGRESS NOTES
[address] [facility] - [address] [address]  Dept: Carmen [phone]  3/25/2022  12:43 PM      Lala Murphy  Procedure: Hysteroscopy D&C      Lala Murphy is a 51 y.o. female [id number]      The patient was seen, she denies any complaints. She is here to talk about her diagnosis of simple endometrial hyperplasia without atypia. Blood pressure 108/66, pulse 74, height 5' 3\" (1.6 m), weight 155 lb 4 oz (70.4 kg), not currently breastfeeding. Abdominal Exam: soft non-tender. Good bowel sounds. No guarding, rebound or rigidity. No costal vertebral angle tenderness bilateral. No hernias    Extremities: No edema or calf pain noted bilaterally. Pelvic Exam:Exam deferred. Results for orders placed or performed during the hospital encounter of 03/17/22   CBC   Result Value Ref Range    WBC 4.7 3.5 - 11.3 k/uL    RBC 3.92 (L) 3.95 - 5.11 m/uL    Hemoglobin 12.1 11.9 - 15.1 g/dL    Hematocrit 37.1 36.3 - 47.1 %    MCV 94.6 82.6 - 102.9 fL    MCH 30.9 25.2 - 33.5 pg    MCHC 32.6 28.4 - 34.8 g/dL    RDW 13.4 11.8 - 14.4 %    Platelets 388 146 - 754 k/uL    MPV 9.2 8.1 - 13.5 fL    NRBC Automated 0.0 0.0 per 100 WBC   SURGICAL PATHOLOGY REPORT   Result Value Ref Range    Surgical Pathology Report       -- Diagnosis --  ENDOMETRIUM, CURETTINGS:  -SIMPLE HYPERPLASIA WITHOUT ATYPIA.  -FOCAL BREAKDOWN CHANGES. -BENIGN PIECES OF ENDOCERVICAL GLANDULAR MUCOSA. Brittany Smith M.D.  **Electronically Signed Out**         torsten/3/18/2022       Clinical Information  Pre-Op Diagnosis:  ABNORMAL UTERINE BLEEDING, MENORRHAGIA   Operative Findings:  ENDOMETRIAL CURETTINGS  Operation Performed:  D&C    Source of Specimen  A: ENDOMETRIAL CURETTINGS    Gross Description  \"THIEN JACKSON, ENDOMETRIAL CURETTINGS\"  Rubbery tan fragments with a  small amount of clot 9 x 6 x 2.8 cm in aggregate. Entirely.   11cs dw      Microscopic Description  Microscopic examination performed. For , selected slides reviewed by additional  pathologists (Allen Merida, and [facility]), who concur with  the diagnosis. SURGICAL PATHOLOGY CONSULTATION       Patient Name: Glory Leal: [id number]  Path Number: [id number]    [address] [place] [address] Dr  AN AT[facility] PATHOLOGY  [address] [place] [address],  [address]. [facility], [address]  [phone]  Fax: [phone]     POCT urine pregnancy   Result Value Ref Range    HCG, Pregnancy Urine (POC) NEGATIVE NEGATIVE   TYPE AND SCREEN   Result Value Ref Range    Expiration Date 03/20/2022,2359     Arm Band Number BE [id number]     ABO/Rh A POSITIVE     Antibody Screen NEGATIVE            Assessment:      Diagnosis Orders   1. Simple endometrial hyperplasia without atypia          Patient Active Problem List    Diagnosis Date Noted    Simple endometrial hyperplasia without atypia 03/21/2022    S/p Dilatation and curettage, hysteroscopy 3/17/22 03/17/2022    Menorrhagia 03/10/2014    Migraine without aura 03/25/2013          POD# 7   Procedure: see above   Stable   Pathology reviewed and found to be benign. Simple hyperplasia without atypia    Plan:   Return for postop. Discussed diagnoses of simple hyperplasia without atypia and recommendations for hysterectomy since she is past childbearing years. Pt in agreement with that plan. Discussed risks of surgery in normal detailed fashion. Discussed at length the risks and benefits of concurrent bilateral salpingectomy with patient's upcoming scheduled abdominal or pelvic surgery per recommendation of the Society of Gynecologic Oncology, American College of Obstetricians and Gynecologists as this patient is at above average risk for development of ovarian cancer. Advised patient that the primary benefit of such surgery is a 65% reduction in future risk of ovarian cancer.  Patient advised that large scale studies have not demonstrated an increase in estimated blood loss, complications, or operating time but that bleeding, infection, and injury to nearby organs are potential complications with this additional surgery. Finally, patient has been thoroughly counseled regarding the consequence of loss of fertility following this procedure. Patient understands that this loss of fertility can not be reversed and has expressed via verbal and written consent that her wishes are to proceed with this surgery for the purposes of ovarian cancer reduction.   All questions answered  Will schedule pt for SAMANTHA VELA with andrey Ahumada MD

## 2022-03-31 ENCOUNTER — TELEPHONE (OUTPATIENT)
Dept: OBGYN CLINIC | Age: 47
End: 2022-03-31

## 2022-04-13 ENCOUNTER — OFFICE VISIT (OUTPATIENT)
Dept: OBGYN CLINIC | Age: 47
End: 2022-04-13
Payer: COMMERCIAL

## 2022-04-13 VITALS
DIASTOLIC BLOOD PRESSURE: 66 MMHG | HEART RATE: 67 BPM | SYSTOLIC BLOOD PRESSURE: 106 MMHG | BODY MASS INDEX: 27.82 KG/M2 | WEIGHT: 157 LBS | HEIGHT: 63 IN

## 2022-04-13 DIAGNOSIS — Z91.89 AT HIGH RISK FOR BREAST CANCER: ICD-10-CM

## 2022-04-13 DIAGNOSIS — Z01.419 WELL WOMAN EXAM WITH ROUTINE GYNECOLOGICAL EXAM: Primary | ICD-10-CM

## 2022-04-13 PROCEDURE — 99396 PREV VISIT EST AGE 40-64: CPT | Performed by: OBSTETRICS & GYNECOLOGY

## 2022-04-13 ASSESSMENT — ENCOUNTER SYMPTOMS
ABDOMINAL PAIN: 0
SHORTNESS OF BREATH: 0
BACK PAIN: 0
COUGH: 0

## 2022-04-13 NOTE — PROGRESS NOTES
[address] [facility] - [address] [place] [address]  Dept: [phone]    Chief complaint:   Chief Complaint   Patient presents with    Gynecologic Exam     Last pap 20 WNL        History Present Illness: [facility] is a 50 yo female who presents for her annual exam.  She had an ablation 1 month ago and she hasn't had a period since. She has a hysterectomy at the end of the month. She is sexually active for her  and denies any dyspareunia. She denies any pelvic pain. She denies any bowel or bladder issues. Current Medications (OTC/Herbal):   Current Outpatient Medications   Medication Sig Dispense Refill    SUMAtriptan (IMITREX) 100 MG tablet TAKE ONE TABLET BY MOUTH AT ONSET OF HEADACHE; MAY REPEAT ONE TABLET IN 2 HOURS IF NEEDED. 12 tablet 11    Multiple Vitamin (MULTI VITAMIN DAILY PO) Take 1 tablet by mouth daily       tobramycin-dexamethasone (TOBRADEX) 0.3-0.1 % ophthalmic suspension  (Patient not taking: Reported on 3/25/2022)       No current facility-administered medications for this visit.      Allergies: No Known Allergies  Past Medical History:   Past Medical History:   Diagnosis Date    Abnormal uterine bleeding (AUB)     Migraine 2014    S/p Dilatation and curettage, hysteroscopy 3/17/22 3/17/2022     Past Surgical History:   Past Surgical History:   Procedure Laterality Date    DILATION AND CURETTAGE OF UTERUS  2022    DILATATION AND CURETTAGE HYSTEROSCOPY    DILATION AND CURETTAGE OF UTERUS N/A 3/17/2022    DILATATION AND CURETTAGE HYSTEROSCOPY performed by Johanna Landers MD at [address] [facility] History:   5  Para 4  Gynecologic History: LMP no period since ablation   Menarche 10  Duration 5 d    Interval q  28 d  Tampons/Pads in a day: 4-5  Last Pap: 20       Any history of abnormal paps no    PriorColpo/Biopsy n/a     Last Mammogram 10/3/20  Result normal  Contraception: vasectomy  Complications: none  STDs: none  Psychosocial History: Occupation:   Special    Caffeine Yes    At risk for depression No    Abuse:   No  Seatbelt:   Yes  Exercise:  No    Social History     Socioeconomic History    Marital status:      Spouse name: Not on file    Number of children: Not on file    Years of education: Not on file    Highest education level: Not on file   Occupational History    Not on file   Tobacco Use    Smoking status: Never Smoker    Smokeless tobacco: Never Used   Vaping Use    Vaping Use: Never used   Substance and Sexual Activity    Alcohol use: Yes     Comment: social    Drug use: No    Sexual activity: Yes     Partners: Male   Other Topics Concern    Not on file   Social History Narrative    Not on file     Social Determinants of Health     Financial Resource Strain:     Difficulty of Paying Living Expenses: Not on file   Food Insecurity:     Worried About Running Out of Food in the Last Year: Not on file    Sai of Food in the Last Year: Not on file   Transportation Needs:     Lack of Transportation (Medical): Not on file    Lack of Transportation (Non-Medical):  Not on file   Physical Activity:     Days of Exercise per Week: Not on file    Minutes of Exercise per Session: Not on file   Stress:     Feeling of Stress : Not on file   Social Connections:     Frequency of Communication with Friends and Family: Not on file    Frequency of Social Gatherings with Friends and Family: Not on file    Attends [employer] Services: Not on file    Active Member of Clubs or Organizations: Not on file    Attends Club or Organization Meetings: Not on file    Marital Status: Not on file   Intimate Partner Violence:     Fear of Current or Ex-Partner: Not on file    Emotionally Abused: Not on file    Physically Abused: Not on file    Sexually Abused: Not on file   Housing Stability:     Unable to Pay for Housing in the Last Year: Not on file    Number of Places Lived in the Last Year: Not on file    Unstable Housing in the Last Year: Not on file       Family History   Problem Relation Age of Onset    Diabetes Father     Hypertension Father     Heart Disease Father     Cancer Father         prostate and lung    Diabetes Mother     Hypertension Mother     Breast Cancer Mother     Arthritis Mother     Depression Mother     High Cholesterol Mother     Vision Loss Mother         macular degeneration    Hearing Loss Mother     Breast Cancer Paternal Grandmother         ? Review of Systems:   Review of Systems   Constitutional: Negative for chills and fatigue. HENT: Negative for congestion and hearing loss. Respiratory: Negative for cough and shortness of breath. Cardiovascular: Negative for chest pain and palpitations. Gastrointestinal: Negative for abdominal pain. Genitourinary: Negative for dyspareunia, pelvic pain and vaginal discharge. Musculoskeletal: Negative for back pain. Neurological: Negative for dizziness and light-headedness. Psychiatric/Behavioral: The patient is not nervous/anxious. Physical exam:  vitals:  Height   5  ft    3 in,  Weight    157 lbs,   106/66 BP  Gen: alert, no apparent distress  HEENT:No pathologic skin lesions noted,NC/AT,PERRL, normal midline nontender thyroid   Lung Exam: Clear to auscultation in all fields bilaterally, without wheezes,rales or rhonchi. Cardiac Exam: Normal sinus rhythm andrate, without murmurs, rubs or gallops appreciated. Breast Exam: Symmetric without pathological skin changes, nontender without discrete suspicious masses palpated, supraclavicular or axillary adenopathy or nipple discharge noted. Abdominal Exam: Nontender to deep palpation without organomegaly, masses or CVAT appreciated, BS positive. No spinal deformation or tenderness. External Genitalia: Normal development without vulvar,vaginal or cervical lesions noted.   Normal vaginal discharge, uterus anterior, 4-6 weeks without CMT. Adnexa nontender without abnormal masses bilaterally. Rectal Exam: Omitted. Extremities: Nontender without clubbing, cyanosis or edema. F.R.O.M. Neurologic Exam: Grossly intact without noted sensorimotor deficits and oriented x 3. Assessment/Plan:   Unremarkable annual Gyn exam.    Cervical Cytology Evaluation begins at 24years old. If Negative Cytology, Follow-up screening per current guidelines. Mammograms every 1year. If 37 yo and last mammogram was negative. Hereditary Breast, Ovarian, Colon and Uterine Cancer screening done. MRI and mammogram ordered  Calcium and Vitamin D dosing reviewed. Colonoscopy screening reviewed as well as onset for bone density testing. Birth control and barrier recommendations discussed. STD counseling and prevention reviewed. Routine health maintenance per patients PCP.   Pt to follow up for annual exam in 1 year    Kai Sun MD  [address]

## 2022-04-20 DIAGNOSIS — Z01.419 WELL WOMAN EXAM WITH ROUTINE GYNECOLOGICAL EXAM: ICD-10-CM

## 2022-05-10 NOTE — PROGRESS NOTES
[address] [facility] Jake Expose  [address] [facility] [address] [facility] [address]  Dept: [phone]    Charlene Florez  5/11/2022  11:15 AM      Charlene Florez  Procedure: SAMANTHA VELA, cysto      Charlene Florez is a 51 y.o. female [id number]      The patient was seen, she denies any complaints. She denied any shortness of breath, chest pain or dizziness. She denied any nausea, vomiting, or diarrhea. There is no fever, chills, or rigors. The patient denies any vaginal bleeding, discharge or odor. All of her pre-operative complaints are now resolved. Blood pressure (!) 102/59, pulse 76, height 5' 3\" (1.6 m), weight 155 lb 6.4 oz (70.5 kg), last menstrual period 02/13/2022, not currently breastfeeding. Abdominal Exam: soft non-tender. Good bowel sounds. No guarding, rebound or rigidity. No costal vertebral angle tenderness bilateral. No hernias    Incisions: healing well, no drainage, no erythema    Extremities: No edema or calf pain noted bilaterally. Pelvic Exam:  Exam deferred. Results for orders placed or performed during the hospital encounter of 03/17/22   CBC   Result Value Ref Range    WBC 4.7 3.5 - 11.3 k/uL    RBC 3.92 (L) 3.95 - 5.11 m/uL    Hemoglobin 12.1 11.9 - 15.1 g/dL    Hematocrit 37.1 36.3 - 47.1 %    MCV 94.6 82.6 - 102.9 fL    MCH 30.9 25.2 - 33.5 pg    MCHC 32.6 28.4 - 34.8 g/dL    RDW 13.4 11.8 - 14.4 %    Platelets 768 485 - 991 k/uL    MPV 9.2 8.1 - 13.5 fL    NRBC Automated 0.0 0.0 per 100 WBC   SURGICAL PATHOLOGY REPORT   Result Value Ref Range    Surgical Pathology Report       -- Diagnosis --  ENDOMETRIUM, CURETTINGS:  -SIMPLE HYPERPLASIA WITHOUT ATYPIA.  -FOCAL BREAKDOWN CHANGES. -BENIGN PIECES OF ENDOCERVICAL GLANDULAR MUCOSA.       Suha Dolan M.D.  **Electronically Signed Out**         torsten/3/18/2022       Clinical Information  Pre-Op Diagnosis:  ABNORMAL UTERINE BLEEDING, MENORRHAGIA   Operative Findings: ENDOMETRIAL CURETTINGS  Operation Performed:  D&C    Source of Specimen  A: ENDOMETRIAL CURETTINGS    Gross Description  \"THIEN JACKSON, ENDOMETRIAL CURETTINGS\"  Rubbery tan fragments with a  small amount of clot 9 x 6 x 2.8 cm in aggregate. Entirely. 11cs    dw      Microscopic Description  Microscopic examination performed. For , selected slides reviewed by additional  pathologists (Allen Cardenas, and [facility]), who concur with  the diagnosis. SURGICAL PATHOLOGY CONSULTATION       Patient Name: Saad Sandra: [id number]  Path Number: [id number]    [address] [place] [address] Dr BUENO [facility]  [address] [place] [address], [address]. [facility], [address]  [phone]  Fax: [phone]     POCT urine pregnancy   Result Value Ref Range    HCG, Pregnancy Urine (POC) NEGATIVE NEGATIVE   TYPE AND SCREEN   Result Value Ref Range    Expiration Date 03/20/2022,2359     Arm Band Number BE [id number]     ABO/Rh A POSITIVE     Antibody Screen NEGATIVE            Assessment:      Diagnosis Orders   1. S/P laparoscopic hysterectomy     2. Postop check          Patient Active Problem List    Diagnosis Date Noted    Simple endometrial hyperplasia without atypia 03/21/2022    S/p Dilatation and curettage, hysteroscopy 3/17/22 03/17/2022    Menorrhagia 03/10/2014    Migraine without aura 03/25/2013          POD# 14   Procedure: see above   Stable   Pathology reviewed and found to be benign. Yes    Plan:   Return in about 4 weeks (around 6/8/2022) for postop. Continue with restrictions. Pelvic rest. No lifting or intercourse. No baths or pools. No douching or tampons.    Return to office 4 weeks    Alexander Billy MD

## 2022-05-11 ENCOUNTER — OFFICE VISIT (OUTPATIENT)
Dept: OBGYN CLINIC | Age: 47
End: 2022-05-11

## 2022-05-11 VITALS
DIASTOLIC BLOOD PRESSURE: 59 MMHG | HEART RATE: 76 BPM | SYSTOLIC BLOOD PRESSURE: 102 MMHG | BODY MASS INDEX: 27.54 KG/M2 | HEIGHT: 63 IN | WEIGHT: 155.4 LBS

## 2022-05-11 DIAGNOSIS — Z90.710 S/P LAPAROSCOPIC HYSTERECTOMY: Primary | ICD-10-CM

## 2022-05-11 DIAGNOSIS — Z09 POSTOP CHECK: ICD-10-CM

## 2022-05-11 PROCEDURE — 99024 POSTOP FOLLOW-UP VISIT: CPT | Performed by: OBSTETRICS & GYNECOLOGY

## 2022-06-08 ENCOUNTER — OFFICE VISIT (OUTPATIENT)
Dept: OBGYN CLINIC | Age: 47
End: 2022-06-08

## 2022-06-08 VITALS
HEART RATE: 70 BPM | DIASTOLIC BLOOD PRESSURE: 64 MMHG | SYSTOLIC BLOOD PRESSURE: 100 MMHG | BODY MASS INDEX: 27.54 KG/M2 | HEIGHT: 63 IN | WEIGHT: 155.4 LBS

## 2022-06-08 DIAGNOSIS — Z90.710 STATUS POST LAPAROSCOPIC HYSTERECTOMY: Primary | ICD-10-CM

## 2022-06-08 DIAGNOSIS — Z09 POSTOP CHECK: ICD-10-CM

## 2022-06-08 PROCEDURE — 99024 POSTOP FOLLOW-UP VISIT: CPT | Performed by: OBSTETRICS & GYNECOLOGY

## 2022-06-08 NOTE — PROGRESS NOTES
[address] [facility] Hanh Henslye  [address] [facility] [address] [address]  Dept: [phone]    Louis Cuellar  6/8/2022  10:19 AM      Louis Cuellar  Procedure: SAMANTHA VELA, cysto      Louis Cuellar is a 51 y.o. female [id number]      The patient was seen, she denies any complaints. She denied any shortness of breath, chest pain or dizziness. She denied any nausea, vomiting, or diarrhea. There is no fever, chills, or rigors. The patient denies any vaginal bleeding, discharge or odor. All of her pre-operative complaints are now resolved. Blood pressure 100/64, pulse 70, height 5' 3\" (1.6 m), weight 155 lb 6.4 oz (70.5 kg), last menstrual period 02/13/2022, not currently breastfeeding. Chaperone for Intimate Exam   Chaperone was offered as part of the rooming process. Patient declined and agrees to continue with exam without a chaperone. Abdominal Exam: soft non-tender. Good bowel sounds. No guarding, rebound or rigidity. No costal vertebral angle tenderness bilateral. No hernias    Incisions: healing well, no drainage, no erythema    Extremities: No edema or calf pain noted bilaterally. Pelvic Exam:  Normal appearing vulva and vagina. Normal appearing vaginal cuff. Small area of granulation tissue in the midline. No defects noted.        Results for orders placed or performed during the hospital encounter of 03/17/22   CBC   Result Value Ref Range    WBC 4.7 3.5 - 11.3 k/uL    RBC 3.92 (L) 3.95 - 5.11 m/uL    Hemoglobin 12.1 11.9 - 15.1 g/dL    Hematocrit 37.1 36.3 - 47.1 %    MCV 94.6 82.6 - 102.9 fL    MCH 30.9 25.2 - 33.5 pg    MCHC 32.6 28.4 - 34.8 g/dL    RDW 13.4 11.8 - 14.4 %    Platelets 171 354 - 112 k/uL    MPV 9.2 8.1 - 13.5 fL    NRBC Automated 0.0 0.0 per 100 WBC   SURGICAL PATHOLOGY REPORT   Result Value Ref Range    Surgical Pathology Report       -- Diagnosis --  ENDOMETRIUM, CURETTINGS:  -SIMPLE HYPERPLASIA WITHOUT ATYPIA.  -FOCAL BREAKDOWN CHANGES. -BENIGN PIECES OF ENDOCERVICAL GLANDULAR MUCOSA. Suha Dolan M.D.  **Electronically Signed Out**         torsten/3/18/2022       Clinical Information  Pre-Op Diagnosis:  ABNORMAL UTERINE BLEEDING, MENORRHAGIA   Operative Findings:  ENDOMETRIAL CURETTINGS  Operation Performed:  D&C    Source of Specimen  A: ENDOMETRIAL CURETTINGS    Gross Description  \"THIEN MANUEL, ENDOMETRIAL CURETTINGS\"  Rubbery tan fragments with a  small amount of clot 9 x 6 x 2.8 cm in aggregate. Entirely. 11cs    dw      Microscopic Description  Microscopic examination performed. For , selected slides reviewed by additional  pathologists (Allen Escalante, and [facility]), who concur with  the diagnosis. SURGICAL PATHOLOGY CONSULTATION       Patient Name: Choco Singh: [id number]  Path Number: [id number]    [address] [place] [address] Dr BUENO [facility]  [address] [place] [address], [facility] [address] [place], [address]  [phone]  Fax: [phone]     POCT urine pregnancy   Result Value Ref Range    HCG, Pregnancy Urine (POC) NEGATIVE NEGATIVE   TYPE AND SCREEN   Result Value Ref Range    Expiration Date 03/20/2022,2359     Arm Band Number BE [id number]     ABO/Rh A POSITIVE     Antibody Screen NEGATIVE            Assessment:      Diagnosis Orders   1. Status post laparoscopic hysterectomy     2. Postop check          Patient Active Problem List    Diagnosis Date Noted    Simple endometrial hyperplasia without atypia 03/21/2022    S/p Dilatation and curettage, hysteroscopy 3/17/22 03/17/2022    Menorrhagia 03/10/2014    Migraine without aura 03/25/2013          POD# 6 wks   Procedure: see above   Stable   Pathology reviewed and found to be benign.  Yes    Plan:   Return for annual exam.    Rudolph Bedolla MD

## 2022-10-13 ENCOUNTER — HOSPITAL ENCOUNTER (OUTPATIENT)
Dept: MAMMOGRAPHY | Age: 47
Discharge: HOME | End: 2022-10-15
Payer: COMMERCIAL

## 2022-10-13 DIAGNOSIS — Z12.31 VISIT FOR SCREENING MAMMOGRAM: ICD-10-CM

## 2022-10-13 PROCEDURE — 77063 BREAST TOMOSYNTHESIS BI: CPT

## 2022-11-10 DIAGNOSIS — R92.8 ABNORMAL MAMMOGRAM OF LEFT BREAST: ICD-10-CM

## 2022-11-10 DIAGNOSIS — N64.89 BREAST ASYMMETRY: ICD-10-CM

## 2023-02-21 ENCOUNTER — OFFICE VISIT (OUTPATIENT)
Dept: OBGYN CLINIC | Age: 48
End: 2023-02-21
Payer: COMMERCIAL

## 2023-02-21 VITALS
HEIGHT: 63 IN | HEART RATE: 60 BPM | DIASTOLIC BLOOD PRESSURE: 61 MMHG | SYSTOLIC BLOOD PRESSURE: 100 MMHG | BODY MASS INDEX: 27.11 KG/M2 | WEIGHT: 153 LBS

## 2023-02-21 DIAGNOSIS — N83.201 CYST OF RIGHT OVARY: Primary | ICD-10-CM

## 2023-02-21 PROCEDURE — 99212 OFFICE O/P EST SF 10 MIN: CPT | Performed by: STUDENT IN AN ORGANIZED HEALTH CARE EDUCATION/TRAINING PROGRAM

## 2023-02-21 NOTE — PROGRESS NOTES
Kaz Cisneros  2023    YOB: 1975    HPI:  Kaz Cisneros is a 49 y.o. female [id number]   The patient was seen and examined today. She is here regarding a right ovarian cyst. On 23 she had right lower quadrant pain that started around 1500. By the end of the night she was in a lot of pain. Pain was consistent and nothing was improving her discomfort. Denies any pain like this before. She got evaluated at [facility] where a CT abd and pelvic US were completed. They found  a 6 cm cyst on her right ovary with small amt of free fluid in pelvis. Since this evening, her pain has completely resolved. Denies any bleeding or abnormal discharge. Denies any signs of infection like N/V, F/C, and pain/swelling. Denies any dysuria or vaginal discharge. Her bowels have been normal. She is s/p RALH with BS. She is still sexually active.      OB History    Para Term  AB Living   5 4 4 0 1 4   SAB IAB Ectopic Molar Multiple Live Births   1 0 0 0 0 4      # Outcome Date GA Lbr John/2nd Weight Sex Delivery Anes PTL Lv   5 Term 2009    F Vag-Spont   EMIL   4 Term 2004    M Vag-Spont   EMLI   3 Term     M Vag-Spont   EMIL   2 Term     M Vag-Spont   EMIL   1 SAB                Past Medical History:   Diagnosis Date    Abnormal uterine bleeding (AUB)     Migraine 2014    S/p Dilatation and curettage, hysteroscopy 3/17/22 3/17/2022       Past Surgical History:   Procedure Laterality Date    DILATION AND CURETTAGE OF UTERUS  2022    DILATATION AND CURETTAGE HYSTEROSCOPY    DILATION AND CURETTAGE OF UTERUS N/A 3/17/2022    DILATATION AND CURETTAGE HYSTEROSCOPY performed by Radha Vanegas MD at [place] [address] History   Problem Relation Age of Onset    Diabetes Father     Hypertension Father     Heart Disease Father     Cancer Father         prostate and lung    Diabetes Mother     Hypertension Mother     Breast Cancer Mother     Arthritis Mother     Depression Mother     High Cholesterol Mother     Vision Loss Mother         macular degeneration    Hearing Loss Mother     Breast Cancer Paternal Grandmother         ? Social History     Socioeconomic History    Marital status:      Spouse name: Not on file    Number of children: Not on file    Years of education: Not on file    Highest education level: Not on file   Occupational History    Not on file   Tobacco Use    Smoking status: Never    Smokeless tobacco: Never   Vaping Use    Vaping Use: Never used   Substance and Sexual Activity    Alcohol use: Yes     Comment: social    Drug use: No    Sexual activity: Yes     Partners: Male   Other Topics Concern    Not on file   Social History Narrative    Not on file     Social Determinants of Health     Financial Resource Strain: Low Risk     Difficulty of Paying Living Expenses: Not hard at all   Food Insecurity: No Food Insecurity    Worried About Running Out of Food in the Last Year: Never true    Ran Out of Food in the Last Year: Never true   Transportation Needs: Not on file   Physical Activity: Not on file   Stress: Not on file   Social Connections: Not on file   Intimate Partner Violence: Not on file   Housing Stability: Not on file       MEDICATIONS:  Current Outpatient Medications   Medication Sig Dispense Refill    Multiple Vitamin (MULTI VITAMIN DAILY PO) Take 1 tablet by mouth daily       SUMAtriptan (IMITREX) 100 MG tablet TAKE ONE TABLET BY MOUTH AT ONSET OF HEADACHE; MAY REPEAT ONE TABLET IN 2 HOURS IF NEEDED. 12 tablet 2    Atogepant (QULIPTA) 30 MG TABS Take 30 mg by mouth daily (Patient not taking: Reported on 2/21/2023) 30 tablet 2    tobramycin-dexamethasone (TOBRADEX) 0.3-0.1 % ophthalmic suspension  (Patient not taking: Reported on 3/25/2022)       No current facility-administered medications for this visit.        ALLERGIES:  Allergies as of 02/21/2023    (No Known Allergies)       REVIEW OF SYSTEMS:    Constitutional: negative fever, negative chills, negative weight changes   HEENT: negative visual disturbances, negative headaches, negative dizziness   Breast: Negative breast abnormalities, negative breast lumps, negative nipple discharge  Respiratory: negative dyspnea, negative cough, negative SOB  Cardiovascular: negative chest pain,  negative palpitations, negative arrhythmia   Gastrointestinal: negative abdominal pain, negative N/V, negative bowel changes, negative heartburn   Genitourinary: negative dysuria, negative hematuria, negative urinary incontinence, negative vaginal discharge, pos RLQ pain (resolved now)  Dermatological: negative rash, negative pruritis, negative mole changes  Hematologic: negative bruising  Immunologic/Lymphatic: negative recent illness, negative recent sick contact  Musculoskeletal: negative back pain, negative myalgias, negative arthralgias  Neurological:  negative dizziness, negative migraines, negative seizures   Behavior/Psych: negative depression, negative anxiety, negative SI, negative HI    VITALS:  Vitals:    02/21/23 0834   BP: 100/61   Site: Right Upper Arm   Position: Sitting   Cuff Size: Large Adult   Pulse: 60   Weight: 153 lb (69.4 kg)   Height: 5' 3\" (1.6 m)       PHYSICAL EXAM:  General appearance: no apparent distress, alert and cooperative  HEENT: head atraumatic, normocephalic, trachea midline, moist mucous membranes   Neurologic: alert, oriented, normal speech, no focal findings or movement disorder noted  Lungs: no increased work of breathing   Abdomen: soft, non-tender on palpation, non acute abdomen   Extremities:  no calf tenderness bilaterally, non-edematous bilaterally   Musculoskeletal: no gross abnormalities, range of motion appropriate for age   Psychiatric: mood appropriate, normal affect      Diagnostics   Danyell Minor MD - 02/19/2023   Formatting of this note might be different from the original.   [address] [place] [address]     HISTORY: Ovarian torsion, pelvic pain     COMPARISON: CT abdomen and pelvis 2/19/2023 at 0013     TECHNIQUE: Transabdominal and transvaginal sonographic evaluation of the pelvis. Transabdominal imaging performed to evaluate for extra adnexal pelvic pathology. Transvaginal imaging performed for better delineation of the adnexal and endometrial contents. Color Doppler also used. FINDINGS:   Uterus: Surgically absent   Right Ovary: 7.3 x 6.3 x 7.3 cm. Left Ovary: 3.4 x 1.6 x 2.2 cm. Normal color flow within the ovaries. Normal arterial and venous waveforms within the right ovary. Doppler assessment the left ovary is somewhat suboptimal, however arterial and venous waveforms appear to be present. A majority of the right ovary is occupied by a large 6.0 x 5.5 x 5.8 cm cyst.  There are some low-level layering internal echoes within the cyst, however, a majority of the cyst appears purely anechoic. There is no internal color flow. Left ovary is unremarkable. Small volume free fluid in the pelvis. IMPRESSION:   *  No sonographic evidence of ovarian torsion. *  Simple cyst in the right ovary is almost certainly benign, but should be followed yearly with ultrasound. Recommend pelvic ultrasound in 12 months. *  Small volume free fluid in the pelvis     CITATION:   Recommendations for adnexal cyst follow-up per Society of Radiologists in Ultrasound 2009 consensus statement on management of asymptomatic and ovarian and other adnexal cysts Ja Leggett et al., Radiology 2010 256: 943-54). Approved by Resident Dayo Abarca DO  on 2/19/2023 1:58 AM     I, Melecio Gates MD have personally reviewed the image(s) and agree with and/or edited the report     Workstation:[id number]     Finalized by Melecio Gates MD on 2/19/2023 2:10 AM    Assessment/Plan:  Right Ovarian Cyst    - VSS   - TVUS 2/19/23 showing a right ovarian cyst measuring 6.0 x 5.5 x 5.8 cm with low-level layering internal echoes but majority of cyst is anechoic.  Small amt of free fluid present    - Pain has resolved    - Discussed if pain returns/worsens or if she starts to develop any signs of infection to schedule f/u sooner    - Will repeat US in a yr to monitor size of cyst, order placed    - Pt to return to office April 2023 for well women exam       Diagnosis Orders   1. Cyst of right ovary  US NON OB TRANSVAGINAL      2. Right ovarian cyst          Patient Active Problem List    Diagnosis Date Noted    Right ovarian cyst 02/21/2023     Priority: Medium     Repeat US Feb 2024       Simple endometrial hyperplasia without atypia 03/21/2022    S/p Dilatation and curettage, hysteroscopy 3/17/22 03/17/2022    Menorrhagia 03/10/2014    Migraine without aura 03/25/2013     Counseling:  Repeat Annual every 1 year  Cervical Cytology Evaluation begins at 24years old. If Negative Cytology, Follow-up screening per current guidelines. Counseled on preventative health maintenance follow-up. Orders Placed This Encounter   Procedures    US NON OB TRANSVAGINAL     This procedure can be scheduled via [employer]. Access your [employer] account by visiting [email or web address].      Standing Status:   Future     Standing Expiration Date:   2/21/2025     DO [facility]  2/21/2023, 9:57 AM

## (undated) DEVICE — GARMENT,MEDLINE,DVT,INT,CALF,MED, GEN2: Brand: MEDLINE

## (undated) DEVICE — SVMMC GYN MIN PK

## (undated) DEVICE — GLOVE ORANGE PI 7   MSG9070

## (undated) DEVICE — FLUID MGMT SYS FLUENT KIT 6/PK

## (undated) DEVICE — PAD,NON-ADHERENT,3X8,STERILE,LF,1/PK: Brand: MEDLINE

## (undated) DEVICE — TRAP TISS FLUENT FLD MGMT SYS

## (undated) DEVICE — PAD,SANITARY,11 IN,MAXI,W/WINGS,N-STRL: Brand: MEDLINE

## (undated) DEVICE — GOWN,AURORA,NONREINFORCED,LARGE: Brand: MEDLINE

## (undated) DEVICE — GLOVE SURG SZ 65 THK91MIL LTX FREE SYN POLYISOPRENE

## (undated) DEVICE — SOLUTION SCRB 4OZ 2% CHG FOR SURG SCRBBING HND WSH

## (undated) DEVICE — UNDERPANTS MAT L XL SEAMLESS CLR CODE WAISTBAND KNIT